# Patient Record
Sex: FEMALE | Race: WHITE | NOT HISPANIC OR LATINO | Employment: OTHER | URBAN - METROPOLITAN AREA
[De-identification: names, ages, dates, MRNs, and addresses within clinical notes are randomized per-mention and may not be internally consistent; named-entity substitution may affect disease eponyms.]

---

## 2017-04-01 ENCOUNTER — APPOINTMENT (OUTPATIENT)
Dept: CT IMAGING | Facility: HOSPITAL | Age: 72
End: 2017-04-01

## 2017-04-01 ENCOUNTER — HOSPITAL ENCOUNTER (EMERGENCY)
Facility: HOSPITAL | Age: 72
Discharge: HOME | End: 2017-04-01
Attending: EMERGENCY MEDICINE | Admitting: EMERGENCY MEDICINE

## 2017-04-01 ENCOUNTER — APPOINTMENT (OUTPATIENT)
Dept: GENERAL RADIOLOGY | Facility: HOSPITAL | Age: 72
End: 2017-04-01

## 2017-04-01 VITALS
RESPIRATION RATE: 18 BRPM | OXYGEN SATURATION: 96 % | HEART RATE: 70 BPM | BODY MASS INDEX: 38.51 KG/M2 | HEIGHT: 69 IN | WEIGHT: 260 LBS | TEMPERATURE: 98 F | DIASTOLIC BLOOD PRESSURE: 74 MMHG | SYSTOLIC BLOOD PRESSURE: 131 MMHG

## 2017-04-01 DIAGNOSIS — S70.02XA CONTUSION OF LEFT HIP, INITIAL ENCOUNTER: ICD-10-CM

## 2017-04-01 DIAGNOSIS — S09.90XA HEAD INJURY, INITIAL ENCOUNTER: ICD-10-CM

## 2017-04-01 DIAGNOSIS — S16.1XXA CERVICAL STRAIN, ACUTE, INITIAL ENCOUNTER: ICD-10-CM

## 2017-04-01 DIAGNOSIS — S80.02XA CONTUSION OF LEFT KNEE, INITIAL ENCOUNTER: ICD-10-CM
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
## 2017-04-01 LAB
INR PPP: 1.4 (ref 0.8–1.2)
PROTHROMBIN TIME: 16.9 SECONDS (ref 12.8–15.2)

## 2017-04-01 PROCEDURE — 85610 PROTHROMBIN TIME: CPT

## 2017-04-01 PROCEDURE — 73502 X-RAY EXAM HIP UNI 2-3 VIEWS: CPT

## 2017-04-01 PROCEDURE — 72125 CT NECK SPINE W/O DYE: CPT

## 2017-04-01 PROCEDURE — 99283 EMERGENCY DEPT VISIT LOW MDM: CPT

## 2017-04-01 PROCEDURE — 70450 CT HEAD/BRAIN W/O DYE: CPT

## 2017-04-01 PROCEDURE — 73560 X-RAY EXAM OF KNEE 1 OR 2: CPT

## 2017-04-01 RX ORDER — ONDANSETRON 4 MG/1
4 TABLET, ORALLY DISINTEGRATING ORAL ONCE
Status: COMPLETED | OUTPATIENT
Start: 2017-04-01 | End: 2017-04-01

## 2017-04-01 RX ORDER — FOLIC ACID 1 MG/1
2 TABLET ORAL DAILY
COMMUNITY

## 2017-04-01 RX ADMIN — ONDANSETRON 4 MG: 4 TABLET, ORALLY DISINTEGRATING ORAL at 12:42

## 2017-04-01 NOTE — ED PROVIDER NOTES
Subjective   Patient is a 71 y.o. female presenting with fall.   History provided by:  Patient   used: No    Fall   Mechanism of injury comment:  Trip and fall off step  Injury location:  Head/neck and leg  Head/neck injury location:  Head, L neck and R neck  Leg injury location:  L hip and L knee  Incident location:  Home  Arrived directly from scene: yes    Associated symptoms: no abdominal pain, no back pain, no chest pain, no headaches, no neck pain and no vomiting    Risk factors: anticoagulation therapy    Risk factors: no asthma, no COPD and no hemophilia        Review of Systems   Constitutional: Negative for chills and fever.   HENT: Negative for ear pain, rhinorrhea and sore throat.    Respiratory: Negative for cough, chest tightness, shortness of breath and wheezing.    Cardiovascular: Negative for chest pain, palpitations and leg swelling.   Gastrointestinal: Negative for abdominal pain and vomiting.   Genitourinary: Negative for dysuria, hematuria, urgency and vaginal pain.   Musculoskeletal: Negative for back pain and neck pain.   Neurological: Negative for dizziness, weakness, light-headedness and headaches.   Psychiatric/Behavioral: Negative.        Past Medical History:   Diagnosis Date   • Arthritis    • Head injury     when she was 21   • Hx of blood clots    • Overactive bladder    • Seizures     has not had one in over 40 years       Allergies   Allergen Reactions   • Percocet [Oxycodone-Acetaminophen] Itching, Nausea And Vomiting and Rash       Past Surgical History:   Procedure Laterality Date   • BREAST BIOPSY      back in the 80s   • REPLACEMENT TOTAL KNEE Left        History reviewed. No pertinent family history.    Social History     Social History   • Marital status:      Spouse name: N/A   • Number of children: N/A   • Years of education: N/A     Social History Main Topics   • Smoking status: Never Smoker   • Smokeless tobacco: Never Used   • Alcohol use Defer    • Drug use: Defer   • Sexual activity: Defer     Other Topics Concern   • None     Social History Narrative           Objective   Physical Exam   Constitutional: She is oriented to person, place, and time. She appears well-developed and well-nourished.   HENT:   Head: Normocephalic.   Right Ear: External ear normal.   Left Ear: External ear normal.   Nose: Nose normal.   Mouth/Throat: Oropharynx is clear and moist.   Small hematoma occiput of the scalp.   Eyes: Conjunctivae and EOM are normal. Pupils are equal, round, and reactive to light. No scleral icterus.   Neck: Normal range of motion. No thyromegaly present.   Cardiovascular: Normal rate, regular rhythm and normal heart sounds.    Pulmonary/Chest: Effort normal and breath sounds normal. No respiratory distress. She has no wheezes. She has no rales. She exhibits no tenderness.   Abdominal: Soft. Bowel sounds are normal. She exhibits no distension. There is no tenderness.   Musculoskeletal:   Tender left hip but full ROM.  No ecchymosis.  No shortening or external rotation.    Lymphadenopathy:     She has no cervical adenopathy.   Neurological: She is alert and oriented to person, place, and time. She has normal reflexes. She displays normal reflexes. No cranial nerve deficit. Coordination normal.   Skin: Skin is warm and dry.   Psychiatric: She has a normal mood and affect. Her behavior is normal. Judgment and thought content normal.   Nursing note and vitals reviewed.      Procedures         ED Course  ED Course                  MDM  Number of Diagnoses or Management Options  Cervical strain, acute, initial encounter: new and requires workup  Contusion of left hip, initial encounter: new and requires workup  Contusion of left knee, initial encounter: new and requires workup  Fall, initial encounter: new and requires workup  Head injury, initial encounter: new and requires workup     Amount and/or Complexity of Data Reviewed  Tests in the radiology section  of CPT®: reviewed and ordered  Discuss the patient with other providers: yes    Patient Progress  Patient progress: stable      Final diagnoses:   Fall, initial encounter   Head injury, initial encounter   Cervical strain, acute, initial encounter   Contusion of left hip, initial encounter   Contusion of left knee, initial encounter            ARMANDO Young  04/01/17 9184

## 2020-09-03 ENCOUNTER — APPOINTMENT (OUTPATIENT)
Dept: CT IMAGING | Facility: HOSPITAL | Age: 75
End: 2020-09-03

## 2020-09-03 ENCOUNTER — HOSPITAL ENCOUNTER (EMERGENCY)
Facility: HOSPITAL | Age: 75
Discharge: HOME | End: 2020-09-03
Attending: EMERGENCY MEDICINE | Admitting: EMERGENCY MEDICINE

## 2020-09-03 VITALS
DIASTOLIC BLOOD PRESSURE: 76 MMHG | HEART RATE: 63 BPM | WEIGHT: 220 LBS | TEMPERATURE: 97.8 F | SYSTOLIC BLOOD PRESSURE: 137 MMHG | BODY MASS INDEX: 29.8 KG/M2 | OXYGEN SATURATION: 96 % | RESPIRATION RATE: 18 BRPM | HEIGHT: 72 IN

## 2020-09-03 DIAGNOSIS — Z79.01 ANTICOAGULATED WITH WARFARIN: ICD-10-CM

## 2020-09-03 DIAGNOSIS — S20.211A CONTUSION OF RIGHT CHEST WALL, INITIAL ENCOUNTER: Primary | ICD-10-CM
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
## 2020-09-03 DIAGNOSIS — S30.0XXA SACRAL CONTUSION, INITIAL ENCOUNTER: ICD-10-CM

## 2020-09-03 DIAGNOSIS — S30.1XXA CONTUSION OF FLANK AND BACK, INITIAL ENCOUNTER: ICD-10-CM

## 2020-09-03 LAB
ALBUMIN SERPL-MCNC: 4.1 G/DL (ref 3.5–5.2)
ALBUMIN/GLOB SERPL: 1.6 G/DL
ALP SERPL-CCNC: 77 U/L (ref 39–117)
ALT SERPL W P-5'-P-CCNC: 14 U/L (ref 1–33)
ANION GAP SERPL CALCULATED.3IONS-SCNC: 9 MMOL/L (ref 5–15)
AST SERPL-CCNC: 18 U/L (ref 1–32)
BASOPHILS # BLD AUTO: 0.02 10*3/MM3 (ref 0–0.2)
BASOPHILS NFR BLD AUTO: 0.5 % (ref 0–1.5)
BILIRUB SERPL-MCNC: 0.4 MG/DL (ref 0–1.2)
BILIRUB UR QL STRIP: NEGATIVE
BUN SERPL-MCNC: 14 MG/DL (ref 8–23)
BUN/CREAT SERPL: 16.7 (ref 7–25)
CALCIUM SPEC-SCNC: 9.7 MG/DL (ref 8.6–10.5)
CHLORIDE SERPL-SCNC: 108 MMOL/L (ref 98–107)
CLARITY UR: CLEAR
CO2 SERPL-SCNC: 25 MMOL/L (ref 22–29)
COLOR UR: YELLOW
CREAT SERPL-MCNC: 0.84 MG/DL (ref 0.57–1)
DEPRECATED RDW RBC AUTO: 53.8 FL (ref 37–54)
EOSINOPHIL # BLD AUTO: 0.25 10*3/MM3 (ref 0–0.4)
EOSINOPHIL NFR BLD AUTO: 6.2 % (ref 0.3–6.2)
ERYTHROCYTE [DISTWIDTH] IN BLOOD BY AUTOMATED COUNT: 15.1 % (ref 12.3–15.4)
GFR SERPL CREATININE-BSD FRML MDRD: 66 ML/MIN/1.73
GLOBULIN UR ELPH-MCNC: 2.5 GM/DL
GLUCOSE SERPL-MCNC: 90 MG/DL (ref 65–99)
GLUCOSE UR STRIP-MCNC: NEGATIVE MG/DL
HCT VFR BLD AUTO: 37.3 % (ref 34–46.6)
HGB BLD-MCNC: 12.2 G/DL (ref 12–15.9)
HGB UR QL STRIP.AUTO: NEGATIVE
IMM GRANULOCYTES # BLD AUTO: 0.02 10*3/MM3 (ref 0–0.05)
IMM GRANULOCYTES NFR BLD AUTO: 0.5 % (ref 0–0.5)
INR PPP: 2.45 (ref 0.85–1.16)
KETONES UR QL STRIP: NEGATIVE
LEUKOCYTE ESTERASE UR QL STRIP.AUTO: NEGATIVE
LYMPHOCYTES # BLD AUTO: 0.89 10*3/MM3 (ref 0.7–3.1)
LYMPHOCYTES NFR BLD AUTO: 21.9 % (ref 19.6–45.3)
MCH RBC QN AUTO: 32.2 PG (ref 26.6–33)
MCHC RBC AUTO-ENTMCNC: 32.7 G/DL (ref 31.5–35.7)
MCV RBC AUTO: 98.4 FL (ref 79–97)
MONOCYTES # BLD AUTO: 0.52 10*3/MM3 (ref 0.1–0.9)
MONOCYTES NFR BLD AUTO: 12.8 % (ref 5–12)
NEUTROPHILS NFR BLD AUTO: 2.36 10*3/MM3 (ref 1.7–7)
NEUTROPHILS NFR BLD AUTO: 58.1 % (ref 42.7–76)
NITRITE UR QL STRIP: NEGATIVE
NRBC BLD AUTO-RTO: 0 /100 WBC (ref 0–0.2)
PH UR STRIP.AUTO: 6.5 [PH] (ref 5–8)
PLATELET # BLD AUTO: 160 10*3/MM3 (ref 140–450)
PMV BLD AUTO: 9.7 FL (ref 6–12)
POTASSIUM SERPL-SCNC: 4.1 MMOL/L (ref 3.5–5.2)
PROT SERPL-MCNC: 6.6 G/DL (ref 6–8.5)
PROT UR QL STRIP: NEGATIVE
PROTHROMBIN TIME: 26.3 SECONDS (ref 11.5–14)
RBC # BLD AUTO: 3.79 10*6/MM3 (ref 3.77–5.28)
SODIUM SERPL-SCNC: 142 MMOL/L (ref 136–145)
SP GR UR STRIP: <=1.005 (ref 1–1.03)
UROBILINOGEN UR QL STRIP: NORMAL
WBC # BLD AUTO: 4.06 10*3/MM3 (ref 3.4–10.8)

## 2020-09-03 PROCEDURE — 85025 COMPLETE CBC W/AUTO DIFF WBC: CPT | Performed by: PHYSICIAN ASSISTANT

## 2020-09-03 PROCEDURE — 74177 CT ABD & PELVIS W/CONTRAST: CPT

## 2020-09-03 PROCEDURE — 99284 EMERGENCY DEPT VISIT MOD MDM: CPT

## 2020-09-03 PROCEDURE — 71250 CT THORAX DX C-: CPT

## 2020-09-03 PROCEDURE — 81003 URINALYSIS AUTO W/O SCOPE: CPT | Performed by: PHYSICIAN ASSISTANT

## 2020-09-03 PROCEDURE — 85610 PROTHROMBIN TIME: CPT | Performed by: PHYSICIAN ASSISTANT

## 2020-09-03 PROCEDURE — 25010000002 IOPAMIDOL 61 % SOLUTION: Performed by: EMERGENCY MEDICINE

## 2020-09-03 PROCEDURE — 80053 COMPREHEN METABOLIC PANEL: CPT | Performed by: PHYSICIAN ASSISTANT

## 2020-09-03 RX ORDER — LIDOCAINE 50 MG/G
1 PATCH TOPICAL EVERY 24 HOURS
Qty: 30 PATCH | Refills: 0 | Status: SHIPPED | OUTPATIENT
Start: 2020-09-03

## 2020-09-03 RX ADMIN — IOPAMIDOL 90 ML: 612 INJECTION, SOLUTION INTRAVENOUS at 19:51

## 2020-09-04 NOTE — ED PROVIDER NOTES
EMERGENCY DEPARTMENT ENCOUNTER    Room Number:  02/02  Date of encounter:  9/3/2020  PCP: Ana Weinberg MD  Historian: Patient      HPI:  Chief Complaint: Fell 1 week ago, right flank pain back pain    A complete HPI/ROS/PMH/PSH/SH/FH are unobtainable due to: NA    Context: Gayle Ferro is a 75 y.o. female who presents to the ED c/o falling after a vertiginous episode approximately 1 week ago.  Patient states she landed on her bottom and struck her left posterior lateral chest wall when she fell.  Since then she has had pain just below her shoulder blade on the left side with certain movements and deep breath.  She also complains of some right flank pain.  She also complains of pain in her tailbone with swelling and bruising.  She is chronically anticoagulated secondary to history of DVT.  Her target INR is 2-3.  She denies headache head injury vision changes neck pain unilateral weakness paresis paresthesias seizures syncope dizziness or lightheadedness.  She does have a history of vertigo and has had for any years.  She states this was simply a vertiginous attack from her turning around too quickly.  She denies other symptoms or injuries.      PAST MEDICAL HISTORY  Active Ambulatory Problems     Diagnosis Date Noted   • No Active Ambulatory Problems     Resolved Ambulatory Problems     Diagnosis Date Noted   • No Resolved Ambulatory Problems     Past Medical History:   Diagnosis Date   • Arthritis    • Head injury    • Hx of blood clots    • Overactive bladder    • Seizures (CMS/HCC)          PAST SURGICAL HISTORY  Past Surgical History:   Procedure Laterality Date   • BREAST BIOPSY      back in the 80s   • REPLACEMENT TOTAL KNEE Left          FAMILY HISTORY  No family history on file.      SOCIAL HISTORY  Social History     Socioeconomic History   • Marital status:      Spouse name: Not on file   • Number of children: Not on file   • Years of education: Not on file   • Highest education level:  Not on file   Tobacco Use   • Smoking status: Never Smoker   • Smokeless tobacco: Never Used   Substance and Sexual Activity   • Alcohol use: Defer   • Drug use: Defer   • Sexual activity: Defer         ALLERGIES  Percocet [oxycodone-acetaminophen]        REVIEW OF SYSTEMS  Review of Systems     All systems reviewed and negative except for those discussed in HPI.       PHYSICAL EXAM    I have reviewed the triage vital signs and nursing notes.    ED Triage Vitals [09/03/20 1451]   Temp Heart Rate Resp BP SpO2   97.8 °F (36.6 °C) 63 18 109/65 98 %      Temp src Heart Rate Source Patient Position BP Location FiO2 (%)   Oral Monitor Sitting Right arm --       Physical Exam  GENERAL: Pleasant awake alert and oriented not toxic appearing hemodynamically stable.  Well developed.  Appears in no acute distress.  HENT: Nares patent  EYES: No scleral icterus  CV: Regular rhythm, regular rate  RESPIRATORY: Normal effort.  No audible wheezes, rales or rhonchi.  Lungs are clear to auscultation with normal thoracic expansion.  ABDOMEN: Soft, nontender no guarding or rebound tenderness.  No palpable organomegaly or pulsatile masses.  No true CVA or suprapubic tenderness.  MUSCULOSKELETAL: She has a bruise to the sacral area in the midline, with corresponding tenderness to palpation.  Left flank and left infrascapular area without external sign of injury.  NEURO: Alert, moves all extremities, follows commands  SKIN: Warm, dry, no rash visualized        LAB RESULTS  Recent Results (from the past 24 hour(s))   Urinalysis With Microscopic If Indicated (No Culture) - Urine, Clean Catch    Collection Time: 09/03/20  6:23 PM   Result Value Ref Range    Color, UA Yellow Yellow, Straw    Appearance, UA Clear Clear    pH, UA 6.5 5.0 - 8.0    Specific Gravity, UA <=1.005 1.001 - 1.030    Glucose, UA Negative Negative    Ketones, UA Negative Negative    Bilirubin, UA Negative Negative    Blood, UA Negative Negative    Protein, UA Negative  Negative    Leuk Esterase, UA Negative Negative    Nitrite, UA Negative Negative    Urobilinogen, UA 0.2 E.U./dL 0.2 - 1.0 E.U./dL   Comprehensive Metabolic Panel    Collection Time: 09/03/20  6:52 PM   Result Value Ref Range    Glucose 90 65 - 99 mg/dL    BUN 14 8 - 23 mg/dL    Creatinine 0.84 0.57 - 1.00 mg/dL    Sodium 142 136 - 145 mmol/L    Potassium 4.1 3.5 - 5.2 mmol/L    Chloride 108 (H) 98 - 107 mmol/L    CO2 25.0 22.0 - 29.0 mmol/L    Calcium 9.7 8.6 - 10.5 mg/dL    Total Protein 6.6 6.0 - 8.5 g/dL    Albumin 4.10 3.50 - 5.20 g/dL    ALT (SGPT) 14 1 - 33 U/L    AST (SGOT) 18 1 - 32 U/L    Alkaline Phosphatase 77 39 - 117 U/L    Total Bilirubin 0.4 0.0 - 1.2 mg/dL    eGFR Non African Amer 66 >60 mL/min/1.73    Globulin 2.5 gm/dL    A/G Ratio 1.6 g/dL    BUN/Creatinine Ratio 16.7 7.0 - 25.0    Anion Gap 9.0 5.0 - 15.0 mmol/L   Protime-INR    Collection Time: 09/03/20  6:52 PM   Result Value Ref Range    Protime 26.3 (H) 11.5 - 14.0 Seconds    INR 2.45 (H) 0.85 - 1.16   CBC Auto Differential    Collection Time: 09/03/20  6:52 PM   Result Value Ref Range    WBC 4.06 3.40 - 10.80 10*3/mm3    RBC 3.79 3.77 - 5.28 10*6/mm3    Hemoglobin 12.2 12.0 - 15.9 g/dL    Hematocrit 37.3 34.0 - 46.6 %    MCV 98.4 (H) 79.0 - 97.0 fL    MCH 32.2 26.6 - 33.0 pg    MCHC 32.7 31.5 - 35.7 g/dL    RDW 15.1 12.3 - 15.4 %    RDW-SD 53.8 37.0 - 54.0 fl    MPV 9.7 6.0 - 12.0 fL    Platelets 160 140 - 450 10*3/mm3    Neutrophil % 58.1 42.7 - 76.0 %    Lymphocyte % 21.9 19.6 - 45.3 %    Monocyte % 12.8 (H) 5.0 - 12.0 %    Eosinophil % 6.2 0.3 - 6.2 %    Basophil % 0.5 0.0 - 1.5 %    Immature Grans % 0.5 0.0 - 0.5 %    Neutrophils, Absolute 2.36 1.70 - 7.00 10*3/mm3    Lymphocytes, Absolute 0.89 0.70 - 3.10 10*3/mm3    Monocytes, Absolute 0.52 0.10 - 0.90 10*3/mm3    Eosinophils, Absolute 0.25 0.00 - 0.40 10*3/mm3    Basophils, Absolute 0.02 0.00 - 0.20 10*3/mm3    Immature Grans, Absolute 0.02 0.00 - 0.05 10*3/mm3    nRBC 0.0 0.0 - 0.2  /100 WBC       Ordered the above labs and independently reviewed the results.        RADIOLOGY  Ct Chest Without Contrast    Result Date: 9/3/2020  CT Chest WO INDICATION: 75-year-old emergency department patient who fell last week with complaint of right infrascapular rib pain, pleuritic back pain. TECHNIQUE: CT of the thorax without IV contrast. Coronal and sagittal reconstructions were obtained.  Radiation dose reduction techniques included automated exposure control or exposure modulation based on body size. Count of known CT and cardiac nuc med studies performed in previous 12 months: 0. COMPARISON: None available. FINDINGS: Images through the thoracic inlet demonstrate a ovoid low density left inferior thyroid lesion measuring 1.1 x 1.2 cm. 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 It is unchanged from cervical spine CT 4/1/2017 and requires no additional follow-up. Images of the chest demonstrate normal caliber aorta. There are coronary calcifications. There are benign calcified lymph nodes and age-related calcifications of the tracheobronchial cartilage. There is no pericardial or pleural fluid. Lung window images demonstrate no evidence of pulmonary contusion. There is a partially calcified granuloma at the right lower lobe on image 35 measuring 5-6 mm. This appears benign. Bone window images demonstrate endplate degenerative spurring. There is no definite compression fracture. The sternum and manubrium are intact. No scapular fracture. No rib fracture seen.     No acute posttraumatic changes. There is no pulmonary contusion, pleural effusion or pneumothorax. No Signer Name: Zoe Knapp MD  Signed: 9/3/2020 8:08 PM  Workstation Name: [id number]  [facility]    Ct Abdomen Pelvis With Contrast    Result Date: 9/3/2020  CT Abdomen Pelvis W INDICATION: 75-year-old emergency department patient with right flank pain, right chest and rib pain following a fall one week ago. Patient is  anticoagulated on Coumadin. Bruising at the tailbone with sacrococcygeal pain TECHNIQUE: CT of the abdomen and pelvis with IV contrast. A delayed postcontrast series was obtained as well. Coronal and sagittal reconstructions were obtained.  Radiation dose reduction techniques included automated exposure control or exposure modulation based on body size. Count of known CT and cardiac nuc med studies performed in previous 12 months: 0. COMPARISON: CT chest 9/3/2020 FINDINGS: Abdomen: The lung bases are clear. There is no pleural effusion or pneumothorax. The lower ribs are intact The liver, spleen, pancreas, gallbladder and bile ducts are normal. The adrenal glands are normal. There is no renal mass or contusion. No stones or ureterectasis. Abdominal aorta is normal in caliber There is no hemorrhage or ascites. There is mild nonspecific groundglass change in the central mesenteric fat. Stomach and small bowel are normal. There is no colonic wall thickening or distention. Pelvis: The bladder, uterus appear normal. There is no adnexal mass or dependent free fluid. Bone window images demonstrate multilevel degenerative disc disease with disc height loss, vacuum phenomena, endplate spurring and sclerosis. There is multilevel facet arthropathy. There is no lumbar fracture. Sacrum and coccyx appear normal. There is no pelvic or hip fracture. There is a nerve stimulator device with power pack in the right posterior lower pelvis and leads terminating on the left and right anterior to the sacrum. The delayed postcontrast series demonstrates symmetric excretion into nondilated ureters. The partially filled bladder is normal.     No acute posttraumatic changes in the abdomen or pelvis. There is multilevel degenerative disc disease and facet arthropathy in the lumbar spine but no compression fracture. Sacrum and sacral iliac joints are normal. No pelvic fracture Signer Name: Zoe Knapp MD  Signed: 9/3/2020 8:15 PM   Workstation Name: [id number]  [facility]          PROCEDURES    Procedures      MEDICATIONS GIVEN IN ER    Medications   iopamidol (ISOVUE-300) 61 % injection 100 mL (90 mL Intravenous Given 9/3/20 1951)         PROGRESS, DATA ANALYSIS, CONSULTS, AND MEDICAL DECISION MAKING    All labs have been independently reviewed by me.  All radiology studies have been reviewed by me and the radiologist dictating the report.   EKG's have been independently viewed and interpreted by me.      Differential diagnoses: Rib fracture rib contusion flank contusion retroperitoneal hematoma, sacrococcygeal fracture.           No acute findings on CT.  We will discharged home with symptomatic care, close follow-up with primary care next week for recheck.  Return if any change or worsening of symptoms.  She verbalizes understanding is agreeable to plan.      AS OF 22:34 VITALS:    BP - 137/76  HR - 63  TEMP - 97.8 °F (36.6 °C) (Oral)  O2 SATS - 96%        DIAGNOSIS  Final diagnoses:   Contusion of right chest wall, initial encounter   Contusion of flank and back, initial encounter   Sacral contusion, initial encounter   Fall, initial encounter   Anticoagulated with warfarin         DISPOSITION  DISCHARGE    Patient discharged in stable condition.    Reviewed implications of results, diagnosis, meds, responsibility to follow up, warning signs and symptoms of possible worsening, potential complications and reasons to return to ER.    Patient/Family voiced understanding of above instructions.    Discussed plan for discharge, as there is no emergent indication for admission.  Pt/family is agreeable and understands need for follow up and possible repeat testing.  Pt/family is aware that discharge does not mean that nothing is wrong but that it indicates no emergency is currently present that requires admission and they must continue care with follow-up as given below or with a physician of their choice.      FOLLOW-UP  Ana Weinberg MD  [address]  [facility] [id number]  [phone]    Schedule an appointment as soon as possible for a visit            Medication List      New Prescriptions    lidocaine 5 %  Commonly known as:  Lidoderm  Place 1 patch on the skin as directed by provider Daily. Remove & Discard   patch within 12 hours or as directed by MD        Stop    LOPERAMIDE A-D Arturo Bland PA-C  09/03/20 [id number]

## 2020-09-04 NOTE — DISCHARGE INSTRUCTIONS
Warm compresses to sore areas every few hours for 20 to 30 minutes.  Tylenol as directed for pain.  Follow-up with your primary care provider early next week for recheck.  Return immediately if any change or worsening of symptoms